# Patient Record
Sex: FEMALE | Race: WHITE
[De-identification: names, ages, dates, MRNs, and addresses within clinical notes are randomized per-mention and may not be internally consistent; named-entity substitution may affect disease eponyms.]

---

## 2017-12-02 NOTE — ED.PDOC
Procedures





- Lumbar Puncture


Position of Patient: Lateral Decubitis


Local Anesthetic Used: Yes (2CC 1% lidocaine)


Gauge of Spinal needle: 22


Lumbar Space Used for Insertion: L4/5


Number of Attempts: 2 (pevious attempts per ER physician)


Spinal Fluid Obtained: Yes (clear 4 quad return without paresthesia)


Fluid Description: Present: Clear


Opening Pressure (mm/H2O): NA Kit not available





Conscious Sedation





- Pre-op Assessment


Weight: 165 lb


Surgical History: UTERUS





- Medical History


Past Medical History: None


Other History: NO

## 2017-12-02 NOTE — DI
EXAM: PA and lateral views of the chest 

  

HISTORY:  Fever 

  

COMPARISON:  Chest Xray from 04/01/2015 

  

FINDINGS: There is no change. Lungs are clear with no lobar consolidation, failure, large effusion or
 significant atelectasis.  Cardiac and mediastinal silhouettes show no acute abnormality.  No acute o
sseous or soft tissue abnormalities. 

  

IMPRESSION: No active disease.

## 2017-12-02 NOTE — ED.PDOC
General


ED Provider: 


Dr. FINA JOHNSON





Chief Complaint: Fever


Stated Complaint: Pateint is a 37 year old female who comes to the ER with 

fever starting last night T max 103 with back pain, neck stiffness and headach.

  states previously had similar symtoms with Urosepsis.


Time Seen by Physician: 11:11


Mode of Arrival: Walk-In


Information Source: Patient


Exam Limitations: No limitations


Nursing and Triage Documentation Reviewed and Agree: Yes





Review of Systems





- Review Of Systems


Constitutional: Reports: Fever


Eyes: Reports: No symptoms


Ears, Nose, Mouth, Throat: Reports: No symptoms


Respiratory: Reports: No symptoms


Cardiac: Reports: No symptoms


Musculoskeletal: Reports: Back pain, Neck pain


Neurological: Reports: Headache


All Other Systems: Reviewed and Negative





Past Medical History





- Past Medical History


Previously Healthy: Yes


Endocrine: Reports: None


Cardiovascular: Reports: None


Respiratory: Reports: None


Hematological: Reports: None


Gastrointestinal: Reports: None


Genitourinary: Reports: UTI


Neuro/Psych: Reports: None


Musculoskeletal: Reports: None


Cancer: Reports: None


Last Menstrual Period: 12/02/17





- Surgical History


General Surgical History: Reports: None





- Family History


Family History: Reports: None





- Social History


Smoking Status: Current every day smoker


Hx Substance Use: No


Alcohol Screening: Occasionally





- Immunizations


Tetanus Shot up to Date: Yes





Physical Exam





- Physical Exam


Appearance: Ill-appearing


Ill-appearing: Severe


Pain Distress: Severe


Eyes: HELEN, EOMI, Conjunctiva clear


ENT: Nose normal, Oropharynx normal


Neck: Nonsupple (stiffness)


Respiratory: Airway patent, Breath sounds clear, Breath sounds equal, 

Respirations nonlabored


Cardiovascular: Pulses normal, Tachycardia


GI/: Soft, Nontender


Musculoskeletal: Normal strength, ROM intact, No edema, No calf tenderness


Skin: Warm, Dry, Normal color


Neurological: Sensation intact, Motor intact, Reflexes intact, Alert, Oriented


Psychiatric: Anxious





Interpretation





- Radiology Interpretation


Radiology Interpretation By: Radiologist


Radiology Results: Negative


Exam Interpreted: CT Scan


Radiology Interpretation By: Radiologist


Exam Interpreted: CXR





- Cardiac Monitor


Rate: Normal


Rhythm: Sinus


Ectopy: None





Re-Evaluation





- Re-Evaluation


Time of Re-Evaluation: 16:14


Status: Improved


Vital Signs Stable: Yes (BP 99/67, P 80)





Physician Notification





- Case Discussed


Physician Notified: Dr. Rodríguez


Time of Notification: 16:40 (accepted to [facility] 111)





Critical Care Note





- Critical Care Note


Total Time (mins): 45





Course





- Course


Hematology/Chemistry: 


 12/02/17 11:10





 12/02/17 11:10


Orders, Labs, Meds: 


Lab Review











  12/02/17 12/02/17 12/02/17





  11:10 11:10 11:10


 


WBC  9.08  


 


RBC  4.33  


 


Hgb  13.5  


 


Hct  39.8  


 


MCV  91.9  


 


MCH  31.2 H  


 


MCHC  33.9  


 


RDW Coeff of Jordy  12.4  


 


Plt Count  175  


 


Immature Gran % (Auto)  0.3  


 


Neut % (Auto)  87.5  


 


Lymph % (Auto)  5.7 L  


 


Mono % (Auto)  6.3  


 


Eos % (Auto)  0.0  


 


Baso % (Auto)  0.2  


 


Immature Gran # (Auto)  0.0  


 


Neut #  7.9 H  


 


Lymph #  0.5 L  


 


Mono #  0.6  


 


Eos #  0.0  


 


Baso #  0.0  


 


Sodium   133 L 


 


Potassium   3.5 


 


Chloride   103 


 


Carbon Dioxide   20 L 


 


Anion Gap   13.5 


 


BUN   6 L 


 


Creatinine   0.67 


 


Estimated GFR (MDRD)   99.00 


 


BUN/Creatinine Ratio   8.95 


 


Glucose   115 H 


 


Lactic Acid   


 


Calcium   9.5 


 


Total Bilirubin   0.28 


 


AST   13 L 


 


ALT   20 


 


Alkaline Phosphatase   83 


 


Total Protein   7.1 


 


Albumin   3.2 L 


 


Globulin   3.9 


 


Albumin/Globulin Ratio   0.82 


 


Procalcitonin    0.17


 


Urine Color   


 


Urine Clarity   


 


Urine pH   


 


Ur Specific Gravity   


 


Urine Protein   


 


Urine Glucose (UA)   


 


Urine Ketones   


 


Urine Blood   


 


Urine Nitrite   


 


Urine Bilirubin   


 


Urine Urobilinogen   


 


Ur Leukocyte Esterase   


 


Urine Microscopic RBC   


 


Urine Microscopic WBC   


 


Ur Squamous Epith Cells   


 


Urine Bacteria   


 


Influenza A (Rapid)   


 


Influenza B (Rapid)   














  12/02/17 12/02/17 12/02/17





  11:10 12:00 12:05


 


WBC   


 


RBC   


 


Hgb   


 


Hct   


 


MCV   


 


MCH   


 


MCHC   


 


RDW Coeff of Jordy   


 


Plt Count   


 


Immature Gran % (Auto)   


 


Neut % (Auto)   


 


Lymph % (Auto)   


 


Mono % (Auto)   


 


Eos % (Auto)   


 


Baso % (Auto)   


 


Immature Gran # (Auto)   


 


Neut #   


 


Lymph #   


 


Mono #   


 


Eos #   


 


Baso #   


 


Sodium   


 


Potassium   


 


Chloride   


 


Carbon Dioxide   


 


Anion Gap   


 


BUN   


 


Creatinine   


 


Estimated GFR (MDRD)   


 


BUN/Creatinine Ratio   


 


Glucose   


 


Lactic Acid  13.5  


 


Calcium   


 


Total Bilirubin   


 


AST   


 


ALT   


 


Alkaline Phosphatase   


 


Total Protein   


 


Albumin   


 


Globulin   


 


Albumin/Globulin Ratio   


 


Procalcitonin   


 


Urine Color   Yellow 


 


Urine Clarity   Slightly 


 


Urine pH   6.0 


 


Ur Specific Gravity   <=1.005 


 


Urine Protein   1+ 


 


Urine Glucose (UA)   Negative 


 


Urine Ketones   Negative 


 


Urine Blood   3+ 


 


Urine Nitrite   Positive 


 


Urine Bilirubin   Negative 


 


Urine Urobilinogen   0.2 


 


Ur Leukocyte Esterase   3+ 


 


Urine Microscopic RBC   2-5 


 


Urine Microscopic WBC    


 


Ur Squamous Epith Cells   0-2 


 


Urine Bacteria   1+ 


 


Influenza A (Rapid)    Negative


 


Influenza B (Rapid)    Negative








Orders











 Category Date Time Status


 


 IV ACCESS ONCE CARE  12/02/17 11:00 Active


 


 ED APPLY O2 .ONCE EMERGENCY  12/02/17 11:00 Active


 


 ED CARDIAC MONITOR APPLIED .ONCE EMERGENCY  12/02/17 11:00 Active


 


 ED IV/MEDIPORT/POWERPORT .ONCE EMERGENCY  12/02/17 11:35 Active


 


 ED VITAL SIGNS Q1HR EMERGENCY  12/02/17 11:00 Active


 


 BLOOD CULTURE Stat LAB  12/02/17 11:53 Received


 


 CBC W/ AUTO DIFF Stat LAB  12/02/17 11:10 Completed


 


 COMPREHENSIVE METABOLIC PANEL Stat LAB  12/02/17 11:10 Completed


 


 GLUCOSE,CSF Stat LAB  12/02/17 11:08 Ordered


 


 LACTIC ACID Stat LAB  12/02/17 11:10 Completed


 


 MOLECULAR GROUP A STREP Stat LAB  12/02/17 12:05 Results


 


 PROCALCITONIN Stat LAB  12/02/17 11:10 Completed


 


 RAPID FLU A/B Stat LAB  12/02/17 12:05 Completed


 


 STREP SCREEN Stat LAB  12/02/17 12:05 Results


 


 TOTAL PROTEIN,CSF Stat LAB  12/02/17 11:08 Ordered


 


 URINALYSIS C & S IF INDICATED Stat LAB  12/02/17 12:00 Completed


 


 URINE CULTURE Stat LAB  12/02/17 12:00 Received


 


 0.9 % Sodium Chloride [Saline Flush] MEDS  12/02/17 11:35 Active





 1 syr IVF PRN PRN   


 


 Ceftriaxone Sodium [Rocephin] MEDS  12/02/17 11:16 Discontinued





 2 gm .ROUTE .STK-MED ONE   


 


 Ceftriaxone Sodium [Rocephin] 2 gm MEDS  12/02/17 11:07 Discontinued





 0.9 % Sodium Chloride [Sodium Chloride] 100 ml   





 IV ONCE   


 


 Fentanyl Amp [Sublimaze] MEDS  12/02/17 11:26 Discontinued





 50 mcg IVP ONCE STA   


 


 Ketorolac Tromethamine [Toradol] MEDS  12/02/17 16:17 Discontinued





 30 mg IVP ONCE STA   


 


 Lidocaine HCl/Pf [Lidocaine HCl 1% Sdv] MEDS  12/02/17 13:37 Discontinued





 5 ml .ROUTE .STK-MED ONE   


 


 Methylprednisolone Sod Succ/Pf [Solu-Medrol 125 mg] MEDS  12/02/17 11:07 

Discontinued





 125 mg IVP ONCE STA   


 


 Midazolam HCl Inj [Versed] MEDS  12/02/17 13:47 Discontinued





 5 mg .ROUTE .STK-MED ONE   


 


 Morphine Sulfate [Morphine 4 mg/ml Vial] MEDS  12/02/17 11:11 Discontinued





 4 mg IVP ONCE STA   


 


 Ondansetron HCl/Pf [Zofran 4 mg/2 ml] MEDS  12/02/17 11:08 Discontinued





 4 mg IVP ONCE STA   


 


 Sodium Chloride 0.9% [Sodium Chloride] 1,000 ml MEDS  12/02/17 11:01 

Discontinued





 IV BOLUS   


 


 Sodium Chloride 0.9% [Sodium Chloride] 1,000 ml MEDS  12/02/17 13:20 

Discontinued





 IV BOLUS   


 


 CHEST, 2 VIEWS PA & LAT Stat RADS  12/02/17 11:01 Completed


 


 CT HEAD W/O CONTRAST Stat RADS  12/02/17 11:10 Completed








Medications











Generic Name Dose Route Start Last Admin





  Trade Name Freq  PRN Reason Stop Dose Admin


 


Sodium Chloride  1 syr  12/02/17 11:35  12/02/17 11:51





  Saline Flush  IVF   1 syr





  PRN PRN   Administration





  To flush IV   














Discontinued Medications














Generic Name Dose Route Start Last Admin





  Trade Name Freq  PRN Reason Stop Dose Admin


 


Fentanyl Citrate  50 mcg  12/02/17 11:26  12/02/17 14:10





  Sublimaze  IVP  12/02/17 11:27  Not Given





  ONCE STA   


 


Sodium Chloride  1,000 mls @ 1,000 mls/hr  12/02/17 11:01  12/02/17 11:47





  Sodium Chloride  IV  12/02/17 12:00  1,000 mls/hr





  BOLUS STA   Administration


 


Ceftriaxone Sodium 2 gm/  100 mls @ 100 mls/hr  12/02/17 11:07  12/02/17 11:48





  Sodium Chloride  IV  12/02/17 12:06  100 mls/hr





  ONCE STA   Administration


 


Sodium Chloride  1,000 mls @ 1,000 mls/hr  12/02/17 13:20  12/02/17 13:21





  Sodium Chloride  IV  12/02/17 14:19  1,000 mls/hr





  BOLUS STA   Administration


 


Ketorolac Tromethamine  30 mg  12/02/17 16:17  12/02/17 17:08





  Toradol  IVP  12/02/17 16:18  30 mg





  ONCE STA   Administration


 


Methylprednisolone Sodium Succinate  125 mg  12/02/17 11:07  12/02/17 11:30





  Solu-Medrol 125 Mg  IVP  12/02/17 11:08  125 mg





  ONCE STA   Administration


 


Morphine Sulfate  4 mg  12/02/17 11:11  12/02/17 11:50





  Morphine 4 Mg/Ml Vial  IVP  12/02/17 11:12  4 mg





  ONCE STA   Administration


 


Ondansetron HCl  4 mg  12/02/17 11:08  12/02/17 11:50





  Zofran 4 Mg/2 Ml  IVP  12/02/17 11:09  4 mg





  ONCE STA   Administration











Vital Signs: 


 











  Temp Pulse Resp BP Pulse Ox


 


 12/02/17 10:45  99.9 F H  156 H  24  108/79  95














Departure





- Departure


Time of Disposition: 16:44


Disposition: TSF SHORT-TRM HOSP


Discharge Problem: 


Urinary tract infection


Qualifiers:


 Urinary tract infection type: acute cystitis Hematuria presence: without 

hematuria Qualified Code(s): N30.00 - Acute cystitis without hematuria





Instructions:  Urinary Tract Infection in Women (ED)


Condition: Stable


Pt referred to PMD for follow-up: No


Allergies/Adverse Reactions: 


Allergies





No Known Allergies Allergy (Unverified 12/02/17 10:49)


 








Home Medications: 


Ambulatory Orders





1 [No Reported Medications]  12/02/17

## 2017-12-02 NOTE — ED.PDOC
Conscious Sedation





- Pre-op Assessment


Weight: 165 lb


NPO Since: ice chips 1330


Procedure: lumbar tap


Surgical History: UTERUS


Previous Anesthesia Difficulty: No


Level Of Consciousness: Awake, Other (drowsy had 4mg Morphine pre op)





- Medical History


Past Medical History: None


Other History: NO





- Physical Exam


Heart Rate/Rhythm: Regular Rhythm


Lung Sounds: Clear


HEENT Within Normal Limits: Yes


Pregnancy Test Negative: No (S/P hysterectomy)


Anesthesia Review of Patient History: Yes





- Anesthesia Care Plan


Anes. Plan Discussed with Patient/Family Permit Signed: Yes


Risk and Benefits Discussed with Patient and Family: Yes


Patient and Family Agree and Understand: Yes


All Patient's and Family's Questions Answered: Yes





- Procedure


Start Time of Procedure: 01:45


End Time of Procedure: 02:00





- Post-Op Anesthesia Follow-up


Anesthesia Complications: No (pt discharged within 24 hour without after anes 

problems)

## 2017-12-02 NOTE — CT
EXAM: CT head without contrast 

  

HISTORY: Headache fever and neck pain 

  

COMPARISON:  None. 

  

TECHNIQUE: Helical axial CT of the head was performed without contrast. Coronal and sagittal reconstr
uctions were performed. 

  

FINDINGS: 

 There is no acute intracranial abnormality. There is no hemorrhage, mass, midline shift, abnormal ex
tra-axial fluid collection, hydrocephalus or evolving ischemia. The gray-white matter junction is wel
l maintained.  Brain parenchyma, ventricles and sulci are  normal. 

  

There are no acute calvarial lesions.  Visualized orbits and globes are unremarkable.  The mastoid ai
r cells demonstrate no significant soft tissue opacification. The visualized paranasal sinuses show n
o air-fluid levels. 

  

IMPRESSION: Negative head CT.

## 2017-12-06 NOTE — ED.PDOC
General


ED Provider: 


Dr. SOFI BOLTON JR





Chief Complaint: Headache


Stated Complaint: pt had lumbar puncture done here on saturday and was 

transferred to [facility]. was discharged on sunday with antibiotics.  has had 

headache since. otc meds not helping[ End ]96.5 91 18 97% 132/93 10/10  

occipital pain and nausea.  .........................................  per up 

to date defiinitive threapy is q blood patch;oral gabapentin [43], intravenous 

hydrocortisone [44], oral theophylline [45], help.  

..........................................  : 12/02/17.  Dr. FINA JOHNSON.  

Stated Complaint: Pateint is a 37 year old female who comes to the ER with 

fever starting last night T max 103 with back pain, neck stiffness and headach.

  states previously had similar symtoms with Urosepsis.  Urine Color Yellow.  

Urine Clarity Slightly.  Urine pH 6.0.  Ur Specific Gravity <=1.005.  Urine 

Protein 1+.  Urine Glucose (UA) Negative.  Urine Ketones Negative.  Urine Blood 

3+.  Urine Nitrite Positive.  Urine Bilirubin Negative.  Urine Urobilinogen 

0.2.  Ur Leukocyte Esterase 3+.  Urine Microscopic RBC 2-5.  Urine Microscopic 

WBC .  Ur Squamous Epith Cells 0-2.  Fentanyl Citrate 50 mcg 12/02/17 11:

26 12/02/17 14:10.  Sublimaze IVP.  Ceftriaxone Sodium 2 gm/.  Sodium Chloride 1

,000 mls @ 1,000 mls/hr.  Ketorolac Tromethamine 30 mg  Solu-Medrol 125 Mg IVP  

Morphine 4 Mg/Ml Vial IVP   Zofran 4 Mg/2 Ml.  12/02/17 10:45 99.9 F H 156 H 24 

108/79 95.  Disposition: TSF SHORT-TRM HOSP.  : acute cystitis Hematuria 

presence: without hematuria Qualified Code(s): N30.00 - Acute cystitis without 

hematuria


Time Seen by Physician: 13:36


Mode of Arrival: Walk-In


Information Source: Patient


Exam Limitations: No limitations


Nursing and Triage Documentation Reviewed and Agree: No





Review of Systems





- Review Of Systems


Constitutional: Reports: No symptoms


Eyes: Reports: No symptoms


Ears, Nose, Mouth, Throat: Reports: No symptoms


Respiratory: Reports: No symptoms


Cardiac: Reports: No symptoms


GI: Reports: Nausea


: Reports: No symptoms (specifically denies)


Musculoskeletal: Reports: No symptoms


Skin: Reports: No symptoms


Neurological: Reports: Headache (occipital)


Endocrine: Reports: No symptoms


Hematologic/Lymphatic: Reports: No symptoms


All Other Systems: Other





Past Medical History





- Past Medical History


Previously Healthy: Yes


Endocrine: Reports: None


Cardiovascular: Reports: None


Respiratory: Reports: None


Hematological: Reports: None


Gastrointestinal: Reports: None


Genitourinary: Reports: UTI


Neuro/Psych: Reports: None


Musculoskeletal: Reports: None


Cancer: Reports: None


Other Pertinent Past Medical History: leep, prolapse uterus,right wrist, tubes 

and one ovary removed





- Surgical History


General Surgical History: Reports: None, Tubal ligation (tubes and one ovary 

removed), Orthopedic (right wrist).  Denies: Hysterectomy (leep, prolapse 

uterus repair )





- Family History


Family History: Reports: None





- Social History


Smoking Status: Current every day smoker


Hx Substance Use: No


Alcohol Screening: Occasionally





Physical Exam





- Physical Exam


Appearance: Ill-appearing, Thin


Pain Distress: Moderate


Eyes: HELEN, EOMI, Conjunctiva clear (fundi normal)


ENT: Ears normal, Nose normal, Oropharynx normal


Neck: Supple


Respiratory: Airway patent, Breath sounds clear, Breath sounds equal, 

Respirations nonlabored


Cardiovascular: RRR, Pulses normal, No rub, No murmur


GI/: Soft, Nontender, No masses, Bowel sounds normal, No Organomegaly


Musculoskeletal: Normal strength, ROM intact, No edema, No calf tenderness (

note rectangular 3x2cm ecchymoses lumbar area)


Skin: Warm, Dry, Normal color (note lesion)


Neurological: Sensation intact, Motor intact, Reflexes intact, Cranial nerves 

intact, Alert, Oriented


Psychiatric: Mood appropriate





Re-Evaluation





- Re-Evaluation


Time of Re-Evaluation: 14:08


Status: Improved (discussed avoiding NSAIDS until no headache for 5 days)





Critical Care Note





- Critical Care Note


Total Time (mins): 5





Course





- Course


Orders, Labs, Meds: 


Orders











 Category Date Time Status


 


 Meperidine HCl/Pf [Demerol 50 mg/ml Vial] MEDS  12/06/17 13:35 Discontinued





 50 mg IM ONCE STA   


 


 Ondansetron HCl/Pf [Zofran 4 mg/2 ml] MEDS  12/06/17 13:34 Discontinued





 4 mg IM ONCE STA   








Medications














Discontinued Medications














Generic Name Dose Route Start Last Admin





  Trade Name Skipq  PRN Reason Stop Dose Admin


 


Meperidine HCl  50 mg  12/06/17 13:35  12/06/17 13:42





  Demerol 50 Mg/Ml Vial  IM  12/06/17 13:36  50 mg





  ONCE STA   Administration


 


Ondansetron HCl  4 mg  12/06/17 13:34  12/06/17 13:42





  Zofran 4 Mg/2 Ml  IM  12/06/17 13:35  4 mg





  ONCE STA   Administration











Vital Signs: 


 











  Temp Pulse Resp BP Pulse Ox


 


 12/06/17 13:03  96.5 F L  91 H  18  132/93 H  97














Departure





- Departure


Time of Disposition: 14:10


Disposition: HOME SELF-CARE


Discharge Problem: 


 Headache, Post lumbar puncture headache





Instructions:  Lumbar Puncture (ED), General Headache (ED)


Condition: Good


Pt referred to PMD for follow-up: Yes


Additional Instructions: 


may follow up with [place] [facility]





recheck one week, follow up sooner if headache not resolved


recommended treatment for post spinal puncture headache is a blood patch


avoid NSAIDS for five days after headache resolved


may use Tylenol but do not exceed bottle doses 


NOTE Norco and many other medications contain Tylenol





follow up one week PMD recheck urine


discuss prophylactic therapy and monitoring


may follow up with Urology


Prescriptions: 


Hydrocodone Bit/Acetaminophen [Norco 5-325] 1 - 2 tab PO Q6HR PRN #12 tablet


 PRN Reason: pain


Orphenadrine Citrate [Norflex] 100 mg PO Q12H PRN #30 tablet.er


 PRN Reason: headache


Allergies/Adverse Reactions: 


Allergies





No Known Allergies Allergy (Verified 12/06/17 13:09)


 








Home Medications: 


Ambulatory Orders





Hydrocodone Bit/Acetaminophen [Norco 5-325] 1 - 2 tab PO Q6HR PRN #12 tablet 12/ 06/17 


Orphenadrine Citrate [Norflex] 100 mg PO Q12H PRN #30 tablet.er 12/06/17

## 2018-08-30 NOTE — ED.PDOC
General


ED Provider: 


Dr. FINA JOHNSON





Chief Complaint: Abdominal Pain


Stated Complaint: Patient states that she has a history of gallstone with 

cholecystitis and is supposed to have surgery next month. She was seen by her 

surgeon yesterday given bentyl and Zofran but has not helped. Pain on the right 

upper quadrant is worse today with radiation to the back


Time Seen by Physician: 21:50


Mode of Arrival: Walk-In


Information Source: Patient


Exam Limitations: No limitations


Primary Care Provider: 


ARLEN STEWART





Nursing and Triage Documentation Reviewed and Agree: Yes


Does patient meet sepsis criteria?: Yes


If yes, has appropriate treatment been initiated?: Yes


System Inflammatory Response Syndrome: Pulse >90 BPM, Resp >20/Minute


Sepsis Protocol: 


For patient's 13 years and over:





Temp is 96.8 and below  and greater


Pulse >90 BPM


Resp >20/minute


Acutely Altered Mental Status





Are patient's symptoms suggestive of a new infection, such as:


   -Pneumonia


   -Skin, Soft Tissue


   -Endocarditis


   -UTI


   -Bone, Joint Infection


   -Implantable Device


   -Acute Abdominal Infection


   -Wound Infection


   -Meningitis


   -Blood Stream Catheter Infection


   -Unknown








GI Complaint Exam





- Abdominal Pain Complaint/Exam


Onset: Gradual


Duration: 1 week 


Symptoms Are: Still present


Initial Severity: Moderate


Current Severity: Severe


Location of Pain: RUQ


Radiates To: Reports: Back


Character: Reports: Aching, Throbbing


Aggravating: Reports: Movement, Food, Position


Associated Signs and Symptoms: Reports: Back pain, Nausea, Vomiting


AAA Risk Factors: Reports: None


Cardiac Risk Factors: Reports: None


Ectopic Pregnancy Risk Factors: Reports: None


Ovarian Torsion Risk Factors: Reports: None


Surgical Obstruction Risk Factors: Reports: None


Related Surgical History: Reports: None


Patient Rh Status: Negative


Abdominal Findings: Present: None


Differential Diagnoses: GB, PUD





Review of Systems





- Review Of Systems


Constitutional: Reports: No symptoms


Eyes: Reports: No symptoms


Ears, Nose, Mouth, Throat: Reports: No symptoms


Respiratory: Reports: No symptoms


Cardiac: Reports: No symptoms


GI: Reports: Abdominal pain, Nausea, Poor appetite, Poor fluid intake, Vomiting


: Reports: No symptoms


Musculoskeletal: Reports: No symptoms


Skin: Reports: No symptoms


Neurological: Reports: No symptoms


Endocrine: Reports: No symptoms


Hematologic/Lymphatic: Reports: No symptoms


All Other Systems: Reviewed and Negative





Past Medical History





- Past Medical History


Previously Healthy: Yes


Endocrine: Reports: None


Cardiovascular: Reports: None


Respiratory: Reports: None


Hematological: Reports: None


Gastrointestinal: Reports: Gallstones


Genitourinary: Reports: UTI


Neuro/Psych: Reports: None


Musculoskeletal: Reports: None


Cancer: Reports: None


Last Menstrual Period: PRESENTLY


Other Pertinent Past Medical History: leep, prolapse uterus,right wrist, tubes 

and one ovary removed





- Surgical History


General Surgical History: Reports: None, Tubal ligation (tubes and one ovary 

removed), Orthopedic (right wrist).  Denies: Hysterectomy (leep, prolapse 

uterus repair )





- Family History


Family History: Reports: None





- Social History


Smoking Status: Current every day smoker, Heavy tobacco smoker


Hx Substance Use: No


Alcohol Screening: Occasionally





- Immunizations


Tetanus Shot up to Date:  (UNKNOWN)





Physical Exam





- Physical Exam


Appearance: Ill-appearing


Ill-appearing: Severe


Pain Distress: Severe


Eyes: HELEN, EOMI, Conjunctiva clear


Neck: Supple


Respiratory: Airway patent, Breath sounds clear, Breath sounds equal, 

Respirations nonlabored


Cardiovascular: Pulses normal, No rub, No murmur, Tachycardia


GI/: Soft, Nontender, No masses, Bowel sounds normal, No Organomegaly


Musculoskeletal: Normal strength, ROM intact, No edema, No calf tenderness


Skin: Warm, Dry, Normal color


Neurological: Sensation intact, Motor intact, Reflexes intact, Cranial nerves 

intact, Alert, Oriented


Psychiatric: Affect appropriate, Mood appropriate





Critical Care Note





- Critical Care Note


Total Time (mins): 30





Course





- Course


Hematology/Chemistry: 


 08/30/18 22:28





 08/30/18 22:28


Orders, Labs, Meds: 


Lab Review











  08/30/18 08/30/18





  22:28 22:28


 


WBC  6.66 


 


RBC  4.01 L 


 


Hgb  12.3 


 


Hct  37.3 


 


MCV  93.0 


 


MCH  30.7 


 


MCHC  33.0 


 


RDW Coeff of Jordy  12.5 


 


Plt Count  200 


 


Immature Gran % (Auto)  0.2 


 


Neut % (Auto)  54.0 


 


Lymph % (Auto)  33.9 


 


Mono % (Auto)  7.2 


 


Eos % (Auto)  4.1 


 


Baso % (Auto)  0.6 


 


Immature Gran # (Auto)  0.0 


 


Neut # (Auto)  3.6 


 


Lymph # (Auto)  2.3 


 


Mono # (Auto)  0.5 


 


Eos # (Auto)  0.3 


 


Baso # (Auto)  0.0 


 


Sodium   142


 


Potassium   3.3 L


 


Chloride   111 H


 


Carbon Dioxide   24


 


Anion Gap   10.3


 


BUN   10


 


Creatinine   0.71


 


Estimated GFR (MDRD)   92.00


 


BUN/Creatinine Ratio   14.08


 


Glucose   105


 


Calcium   9.0


 


Total Bilirubin   0.3


 


AST   14 L


 


ALT   13


 


Alkaline Phosphatase   73


 


Total Protein   6.5


 


Albumin   3.4


 


Globulin   3.1


 


Albumin/Globulin Ratio   1.10


 


Amylase   40


 


Lipase   34








Orders











 Category Date Time Status


 


 ED IV/MEDIPORT/POWERPORT .ONCE EMERGENCY  08/30/18 22:26 Active


 


 Vital signs [ED VITAL SIGNS] .ONCE EMERGENCY  08/30/18 22:55 Ordered


 


 AMYLASE Stat LAB  08/30/18 22:28 Received


 


 CBC W/ AUTO DIFF Stat LAB  08/30/18 22:28 Completed


 


 CMP [COMPREHENSIVE METABOLIC PANEL] Stat LAB  08/30/18 22:28 Received


 


 LACTIC ACID Stat LAB  08/30/18 22:52 Ordered


 


 LIPASE Stat LAB  08/30/18 22:28 Received


 


 PROCALCITONIN Stat LAB  08/30/18 22:53 Ordered


 


 0.9 % Sodium Chloride [Saline Flush] MEDS  08/30/18 22:26 Ordered





 1 syr IVF PRN PRN   


 


 Hydromorphone HCl [Dilaudid 2 mg/ml Sdv] MEDS  08/30/18 22:17 Discontinued





 1.5 mg IM ONCE STA   


 


 Promethazine HCl [Phenergan 25 mg/ml Vial] MEDS  08/30/18 22:06 Discontinued





 25 mg IM ONCE STA   


 


 Ringers Lactated Solution [Lactated Ringers] 1,000 ml MEDS  08/30/18 22:26 

Active





 IV BOLUS   








Medications











Generic Name Dose Route Start Last Admin





  Trade Name Freq  PRN Reason Stop Dose Admin


 


Lactated Ringer's  1,000 mls @ 1,000 mls/hr  08/30/18 22:26  08/30/18 22:42





  Lactated Ringers  IV  08/30/18 23:25  1,000 mls/hr





  BOLUS STA   Administration





     





     





     





     


 


Sodium Chloride  1 syr  08/30/18 22:26  





  Saline Flush  IVF   





  PRN PRN   





  To flush IV   





     





     





     














Discontinued Medications














Generic Name Dose Route Start Last Admin





  Trade Name Freq  PRN Reason Stop Dose Admin


 


Hydromorphone HCl  1.5 mg  08/30/18 22:17  08/30/18 22:33





  Dilaudid 2 Mg/Ml Sdv  IM  08/30/18 22:18  1.5 mg





  ONCE STA   Administration





     





     





     





     


 


Promethazine HCl  25 mg  08/30/18 22:06  08/30/18 22:32





  Phenergan 25 Mg/Ml Vial  IM  08/30/18 22:07  25 mg





  ONCE STA   Administration





     





     





     





     











Vital Signs: 


 











  Temp Pulse Resp BP Pulse Ox


 


 08/30/18 21:35  97.8 F  126 H  24  143/87 H  95














Departure





- Departure


Time of Disposition: 22:49


Disposition: HOME SELF-CARE


Discharge Problem: 


 Cholecystitis, chronic





Instructions:  Biliary Colic (ED)


Condition: Stable


Pt referred to PMD for follow-up: Yes


IPMP verified?: No


Additional Instructions: 


Keep APt with surgery to have your gall bladder taken out 


Take Medications as prescribed  


Prescriptions: 


Hydrocodone/Acetaminophen [Norco 5-325 Tablet] 1 tab PO Q6HR PRN #20 tablet


 PRN Reason: PAIN


Allergies/Adverse Reactions: 


Allergies





No Known Allergies Allergy (Verified 08/30/18 21:48)


 








Home Medications: 


Ambulatory Orders





Hydrocodone/Acetaminophen [Norco 5-325 Tablet] 1 tab PO Q6HR PRN #20 tablet 08/ 30/18 








Disposition Discussed With: Patient, Family

## 2019-01-10 ENCOUNTER — HOSPITAL ENCOUNTER (OUTPATIENT)
Dept: HOSPITAL 58 - RAD | Age: 39
Discharge: HOME | End: 2019-01-10
Attending: NURSE PRACTITIONER

## 2019-01-10 VITALS — BODY MASS INDEX: 32.8 KG/M2

## 2019-01-10 DIAGNOSIS — R31.9: ICD-10-CM

## 2019-01-10 DIAGNOSIS — R10.9: Primary | ICD-10-CM

## 2019-01-10 NOTE — DI
Exam:  Single view of the abdomen. 

  

Comparison:  CT abdomen pelvis performed 04/13/2015. 

  

Reason for exam:  Unspecified abdominal pain. 

  

  

FINDINGS:  The bowel gas pattern is nonspecific and nonobstructive.  Air is seen to the level rectosi
gmoid. 

  

Impression: 

  

Nonspecific, nonobstructive bowel gas pattern

## 2019-04-22 ENCOUNTER — HOSPITAL ENCOUNTER (EMERGENCY)
Dept: HOSPITAL 58 - ED | Age: 39
Discharge: HOME | End: 2019-04-22

## 2019-04-22 VITALS — TEMPERATURE: 97.5 F | DIASTOLIC BLOOD PRESSURE: 94 MMHG | SYSTOLIC BLOOD PRESSURE: 143 MMHG

## 2019-04-22 VITALS — BODY MASS INDEX: 32.6 KG/M2
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
## 2019-04-22 DIAGNOSIS — R51: ICD-10-CM

## 2019-04-22 DIAGNOSIS — M54.2: ICD-10-CM

## 2019-04-22 DIAGNOSIS — M25.561: Primary | ICD-10-CM

## 2019-04-22 DIAGNOSIS — M54.9: ICD-10-CM

## 2019-04-22 DIAGNOSIS — F17.210: ICD-10-CM

## 2019-04-22 DIAGNOSIS — S43.422A: ICD-10-CM

## 2019-04-22 PROCEDURE — 80053 COMPREHEN METABOLIC PANEL: CPT

## 2019-04-22 PROCEDURE — 36415 COLL VENOUS BLD VENIPUNCTURE: CPT

## 2019-04-22 PROCEDURE — 85730 THROMBOPLASTIN TIME PARTIAL: CPT

## 2019-04-22 PROCEDURE — 99283 EMERGENCY DEPT VISIT LOW MDM: CPT

## 2019-04-22 PROCEDURE — 81001 URINALYSIS AUTO W/SCOPE: CPT

## 2019-04-22 PROCEDURE — 85025 COMPLETE CBC W/AUTO DIFF WBC: CPT

## 2019-04-22 PROCEDURE — 96375 TX/PRO/DX INJ NEW DRUG ADDON: CPT

## 2019-04-22 PROCEDURE — 85610 PROTHROMBIN TIME: CPT

## 2019-04-22 PROCEDURE — 96361 HYDRATE IV INFUSION ADD-ON: CPT

## 2019-04-22 PROCEDURE — 96374 THER/PROPH/DIAG INJ IV PUSH: CPT

## 2019-04-22 PROCEDURE — 84703 CHORIONIC GONADOTROPIN ASSAY: CPT

## 2019-04-22 RX ADMIN — Medication PRN SYR: at 09:11

## 2019-04-22 RX ADMIN — Medication PRN SYR: at 10:44
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
